# Patient Record
Sex: FEMALE | Race: WHITE
[De-identification: names, ages, dates, MRNs, and addresses within clinical notes are randomized per-mention and may not be internally consistent; named-entity substitution may affect disease eponyms.]

---

## 2019-07-18 ENCOUNTER — HOSPITAL ENCOUNTER (EMERGENCY)
Dept: HOSPITAL 8 - ED | Age: 19
Discharge: HOME | End: 2019-07-18
Payer: OTHER GOVERNMENT

## 2019-07-18 VITALS — SYSTOLIC BLOOD PRESSURE: 138 MMHG | DIASTOLIC BLOOD PRESSURE: 82 MMHG

## 2019-07-18 VITALS — BODY MASS INDEX: 20.04 KG/M2 | HEIGHT: 62 IN | WEIGHT: 108.91 LBS

## 2019-07-18 DIAGNOSIS — F17.200: ICD-10-CM
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
## 2019-07-18 DIAGNOSIS — S71.112A: Primary | ICD-10-CM
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
## 2019-07-18 PROCEDURE — 99282 EMERGENCY DEPT VISIT SF MDM: CPT

## 2019-07-18 PROCEDURE — 99283 EMERGENCY DEPT VISIT LOW MDM: CPT

## 2019-07-22 ENCOUNTER — HOSPITAL ENCOUNTER (EMERGENCY)
Dept: HOSPITAL 8 - ED | Age: 19
Discharge: HOME | End: 2019-07-22
Payer: OTHER GOVERNMENT

## 2019-07-22 VITALS — SYSTOLIC BLOOD PRESSURE: 123 MMHG | DIASTOLIC BLOOD PRESSURE: 74 MMHG

## 2019-07-22 VITALS — WEIGHT: 41.89 LBS | BODY MASS INDEX: 7.71 KG/M2 | HEIGHT: 62 IN

## 2019-07-22 DIAGNOSIS — S70.322D: Primary | ICD-10-CM

## 2019-07-22 DIAGNOSIS — J45.909: ICD-10-CM

## 2019-07-22 DIAGNOSIS — F17.210: ICD-10-CM
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
## 2019-07-22 PROCEDURE — 99283 EMERGENCY DEPT VISIT LOW MDM: CPT

## 2019-07-22 NOTE — NUR
DC EDUCATION PROVIDED, PT DEMONSTRATES UNDERSTANDING. PT AMBULATED STEADILY TO 
DC WITH RN

-------------------------------------------------------------------------------

Addendum: 07/22/19 at 1545 by LWEGENER

-------------------------------------------------------------------------------

PT REMOVED WOUND DRESSING PLACED BY TECH

## 2019-10-19 ENCOUNTER — HOSPITAL ENCOUNTER (EMERGENCY)
Dept: HOSPITAL 8 - ED | Age: 19
Discharge: HOME | End: 2019-10-19
Payer: OTHER GOVERNMENT

## 2019-10-19 VITALS — BODY MASS INDEX: 19.47 KG/M2 | WEIGHT: 105.82 LBS | HEIGHT: 62 IN

## 2019-10-19 VITALS — DIASTOLIC BLOOD PRESSURE: 82 MMHG | SYSTOLIC BLOOD PRESSURE: 137 MMHG

## 2019-10-19 DIAGNOSIS — M54.2: ICD-10-CM
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
## 2019-10-19 DIAGNOSIS — F17.200: ICD-10-CM

## 2019-10-19 DIAGNOSIS — Z75.9: ICD-10-CM

## 2019-10-19 DIAGNOSIS — M25.511: ICD-10-CM

## 2019-10-19 DIAGNOSIS — Z72.9: ICD-10-CM

## 2019-10-19 DIAGNOSIS — G89.11: Primary | ICD-10-CM

## 2019-10-19 DIAGNOSIS — J45.909: ICD-10-CM

## 2019-10-19 DIAGNOSIS — M54.6: ICD-10-CM
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
## 2019-10-19 DIAGNOSIS — M79.661: ICD-10-CM
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
## 2019-10-19 PROCEDURE — 72125 CT NECK SPINE W/O DYE: CPT

## 2019-10-19 PROCEDURE — 99284 EMERGENCY DEPT VISIT MOD MDM: CPT

## 2019-10-19 PROCEDURE — 84703 CHORIONIC GONADOTROPIN ASSAY: CPT

## 2019-10-19 PROCEDURE — 36415 COLL VENOUS BLD VENIPUNCTURE: CPT
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
## 2020-03-26 ENCOUNTER — HOSPITAL ENCOUNTER (EMERGENCY)
Dept: HOSPITAL 8 - ED | Age: 20
Discharge: HOME | End: 2020-03-26
Payer: COMMERCIAL

## 2020-03-26 VITALS — HEIGHT: 62 IN | WEIGHT: 113.76 LBS | BODY MASS INDEX: 20.93 KG/M2

## 2020-03-26 VITALS — DIASTOLIC BLOOD PRESSURE: 53 MMHG | SYSTOLIC BLOOD PRESSURE: 94 MMHG

## 2020-03-26 DIAGNOSIS — R10.32: ICD-10-CM

## 2020-03-26 DIAGNOSIS — Z3A.01: ICD-10-CM

## 2020-03-26 DIAGNOSIS — J45.909: ICD-10-CM

## 2020-03-26 DIAGNOSIS — Z87.891: ICD-10-CM

## 2020-03-26 DIAGNOSIS — O26.891: ICD-10-CM

## 2020-03-26 DIAGNOSIS — Z32.01: Primary | ICD-10-CM

## 2020-03-26 LAB
ALBUMIN SERPL-MCNC: 3.6 G/DL (ref 3.4–5)
ANION GAP SERPL CALC-SCNC: 5 MMOL/L (ref 5–15)
BASOPHILS # BLD AUTO: 0.05 X10^3/UL (ref 0–0.1)
BASOPHILS NFR BLD AUTO: 1 % (ref 0–1)
CALCIUM SERPL-MCNC: 8.6 MG/DL (ref 8.5–10.1)
CHLORIDE SERPL-SCNC: 108 MMOL/L (ref 98–107)
CREAT SERPL-MCNC: 0.45 MG/DL (ref 0.55–1.02)
CULTURE INDICATED?: NO
EOSINOPHIL # BLD AUTO: 0.18 X10^3/UL (ref 0–0.4)
EOSINOPHIL NFR BLD AUTO: 2 % (ref 1–7)
ERYTHROCYTE [DISTWIDTH] IN BLOOD BY AUTOMATED COUNT: 12.7 % (ref 9.6–15.2)
LYMPHOCYTES # BLD AUTO: 3.16 X10^3/UL (ref 1–3.4)
LYMPHOCYTES NFR BLD AUTO: 30 % (ref 22–44)
MCH RBC QN AUTO: 29.5 PG (ref 27–34.8)
MCHC RBC AUTO-ENTMCNC: 33.8 G/DL (ref 32.4–35.8)
MCV RBC AUTO: 87.1 FL (ref 80–100)
MD: NO
MICROSCOPIC: (no result)
MONOCYTES # BLD AUTO: 0.93 X10^3/UL (ref 0.2–0.8)
MONOCYTES NFR BLD AUTO: 9 % (ref 2–9)
NEUTROPHILS # BLD AUTO: 6.21 X10^3/UL (ref 1.8–6.8)
NEUTROPHILS NFR BLD AUTO: 59 % (ref 42–75)
PLATELET # BLD AUTO: 324 X10^3/UL (ref 130–400)
PMV BLD AUTO: 8.2 FL (ref 7.4–10.4)
RBC # BLD AUTO: 4.62 X10^6/UL (ref 3.82–5.3)

## 2020-03-26 PROCEDURE — 81003 URINALYSIS AUTO W/O SCOPE: CPT

## 2020-03-26 PROCEDURE — 84702 CHORIONIC GONADOTROPIN TEST: CPT

## 2020-03-26 PROCEDURE — 85025 COMPLETE CBC W/AUTO DIFF WBC: CPT

## 2020-03-26 PROCEDURE — 82040 ASSAY OF SERUM ALBUMIN: CPT

## 2020-03-26 PROCEDURE — 76801 OB US < 14 WKS SINGLE FETUS: CPT

## 2020-03-26 PROCEDURE — 99284 EMERGENCY DEPT VISIT MOD MDM: CPT

## 2020-03-26 PROCEDURE — 86901 BLOOD TYPING SEROLOGIC RH(D): CPT

## 2020-03-26 PROCEDURE — 36415 COLL VENOUS BLD VENIPUNCTURE: CPT

## 2020-03-26 PROCEDURE — 80048 BASIC METABOLIC PNL TOTAL CA: CPT

## 2020-03-26 NOTE — NUR
PT CAME IN CO OF LLQ PAIN. "I JUST FOUND OUT I WAS PREGNANT A WEEK AGO. I DONT 
KNOW HOW FAIR ALONG I AM". DENIES VAGINAL BLEEDING. DENIES TRAUMA. BOYFRIEND IS 
BEDSIDE.

## 2020-03-26 NOTE — NUR
PT RESTING IN [facility] AWAITING US. FRIEND BEDSIDE. NO NEEDS AT THIS TIME

## 2020-10-08 ENCOUNTER — HOSPITAL ENCOUNTER (EMERGENCY)
Dept: HOSPITAL 8 - ED | Age: 20
Discharge: HOME | End: 2020-10-08
Payer: OTHER GOVERNMENT

## 2020-10-08 VITALS — HEIGHT: 62 IN | WEIGHT: 128.75 LBS | BODY MASS INDEX: 23.69 KG/M2

## 2020-10-08 VITALS — SYSTOLIC BLOOD PRESSURE: 125 MMHG | DIASTOLIC BLOOD PRESSURE: 65 MMHG

## 2020-10-08 DIAGNOSIS — F17.200: ICD-10-CM

## 2020-10-08 DIAGNOSIS — R10.33: ICD-10-CM

## 2020-10-08 DIAGNOSIS — R11.0: ICD-10-CM

## 2020-10-08 DIAGNOSIS — R19.7: Primary | ICD-10-CM

## 2020-10-08 LAB
ALBUMIN SERPL-MCNC: 4 G/DL (ref 3.4–5)
ALP SERPL-CCNC: 42 U/L (ref 45–117)
ALT SERPL-CCNC: 19 U/L (ref 12–78)
ANION GAP SERPL CALC-SCNC: 5 MMOL/L (ref 5–15)
BASOPHILS # BLD AUTO: 0.1 X10^3/UL (ref 0–0.1)
BASOPHILS NFR BLD AUTO: 1 % (ref 0–1)
BILIRUB SERPL-MCNC: 0.6 MG/DL (ref 0.2–1)
CALCIUM SERPL-MCNC: 9.6 MG/DL (ref 8.5–10.1)
CHLORIDE SERPL-SCNC: 105 MMOL/L (ref 98–107)
CREAT SERPL-MCNC: 0.7 MG/DL (ref 0.55–1.02)
EOSINOPHIL # BLD AUTO: 0.1 X10^3/UL (ref 0–0.4)
EOSINOPHIL NFR BLD AUTO: 1 % (ref 1–7)
ERYTHROCYTE [DISTWIDTH] IN BLOOD BY AUTOMATED COUNT: 12.5 % (ref 9.6–15.2)
LYMPHOCYTES # BLD AUTO: 2.3 X10^3/UL (ref 1–3.4)
LYMPHOCYTES NFR BLD AUTO: 22 % (ref 22–44)
MCH RBC QN AUTO: 29.6 PG (ref 27–34.8)
MCHC RBC AUTO-ENTMCNC: 33.4 G/DL (ref 32.4–35.8)
MD: NO
MICROSCOPIC: (no result)
MONOCYTES # BLD AUTO: 0.9 X10^3/UL (ref 0.2–0.8)
MONOCYTES NFR BLD AUTO: 9 % (ref 2–9)
NEUTROPHILS # BLD AUTO: 6.9 X10^3/UL (ref 1.8–6.8)
NEUTROPHILS NFR BLD AUTO: 67 % (ref 42–75)
PLATELET # BLD AUTO: 342 X10^3/UL (ref 130–400)
PMV BLD AUTO: 8.2 FL (ref 7.4–10.4)
PROT SERPL-MCNC: 7.7 G/DL (ref 6.4–8.2)
RBC # BLD AUTO: 4.89 X10^6/UL (ref 3.82–5.3)

## 2020-10-08 PROCEDURE — 80053 COMPREHEN METABOLIC PANEL: CPT

## 2020-10-08 PROCEDURE — 74021 RADEX ABDOMEN 3+ VIEWS: CPT

## 2020-10-08 PROCEDURE — 99284 EMERGENCY DEPT VISIT MOD MDM: CPT

## 2020-10-08 PROCEDURE — 87086 URINE CULTURE/COLONY COUNT: CPT

## 2020-10-08 PROCEDURE — 36415 COLL VENOUS BLD VENIPUNCTURE: CPT

## 2020-10-08 PROCEDURE — 83690 ASSAY OF LIPASE: CPT

## 2020-10-08 PROCEDURE — 81001 URINALYSIS AUTO W/SCOPE: CPT

## 2020-10-08 PROCEDURE — 85025 COMPLETE CBC W/AUTO DIFF WBC: CPT

## 2020-10-08 PROCEDURE — 84703 CHORIONIC GONADOTROPIN ASSAY: CPT

## 2020-10-08 NOTE — NUR
first contact with pt. pt c/o belly button pain with n/v/d. pt stated"i'm 
feeling like this on and off for 1 week." pt's aox4. resps even and unlabored. 
bp/spo2 monitors in place. call light within reach.